# Patient Record
Sex: MALE | URBAN - METROPOLITAN AREA
[De-identification: names, ages, dates, MRNs, and addresses within clinical notes are randomized per-mention and may not be internally consistent; named-entity substitution may affect disease eponyms.]

---

## 2020-02-14 ENCOUNTER — HOSPITAL ENCOUNTER (OUTPATIENT)
Dept: OCCUPATIONAL MEDICINE | Age: 55
Discharge: HOME | End: 2020-02-14

## 2020-02-14 DIAGNOSIS — Z00.8 HEALTH EXAMINATION IN POPULATION SURVEY: ICD-10-CM

## 2021-05-07 ENCOUNTER — HOSPITAL ENCOUNTER (OUTPATIENT)
Dept: OCCUPATIONAL MEDICINE | Age: 56
Discharge: HOME | End: 2021-05-07

## 2021-05-07 ENCOUNTER — TRANSCRIBE ORDER (OUTPATIENT)
Dept: OCCUPATIONAL MEDICINE | Age: 56
End: 2021-05-07

## 2021-05-07 DIAGNOSIS — Z00.8 HEALTH EXAMINATION IN POPULATION SURVEYS: ICD-10-CM

## 2021-05-07 DIAGNOSIS — Z00.8 HEALTH EXAMINATION IN POPULATION SURVEYS: Primary | ICD-10-CM

## 2024-05-07 NOTE — PROGRESS NOTES
SHANTELLE pallidum Ab; Future  -     TSH with Reflex; Future    Other orders  -     memantine (NAMENDA) 5 MG tablet; Take 1 tablet by mouth 2 times daily  -     donepezil (ARICEPT ODT) 10 MG disintegrating tablet; Take 1 tablet by mouth nightly          Follow-up and Dispositions    Return in about 6 months (around 11/9/2024) for Dementia.           I spent  45 total minutes of today's visit in coordination of care and patient/family education and counseling regarding the above patient concerns, reviewing the patient's medical record, my assessment and recommendations.       Billy Caro JR, PA  [facility] [address], [place] [address]  Phone:[phone]

## 2024-05-09 ENCOUNTER — OFFICE VISIT (OUTPATIENT)
Dept: NEUROLOGY | Age: 59
End: 2024-05-09
Payer: OTHER GOVERNMENT

## 2024-05-09 VITALS
WEIGHT: 188 LBS | HEART RATE: 59 BPM | OXYGEN SATURATION: 97 % | DIASTOLIC BLOOD PRESSURE: 79 MMHG | HEIGHT: 70 IN | SYSTOLIC BLOOD PRESSURE: 131 MMHG | BODY MASS INDEX: 26.92 KG/M2

## 2024-05-09 DIAGNOSIS — R41.89 COGNITIVE IMPAIRMENT: ICD-10-CM

## 2024-05-09 DIAGNOSIS — R41.89 COGNITIVE IMPAIRMENT: Primary | ICD-10-CM

## 2024-05-09 LAB
FOLATE SERPL-MCNC: 9.1 NG/ML (ref 3.1–17.5)
TSH W FREE THYROID IF ABNORMAL: 1.74 UIU/ML (ref 0.27–4.2)
VIT B12 SERPL-MCNC: 593 PG/ML (ref 193–986)

## 2024-05-09 PROCEDURE — 99204 OFFICE O/P NEW MOD 45 MIN: CPT | Performed by: PHYSICAL THERAPIST

## 2024-05-09 RX ORDER — DONEPEZIL HYDROCHLORIDE 5 MG/1
10 TABLET, FILM COATED ORAL NIGHTLY
COMMUNITY
Start: 2024-04-30 | End: 2024-05-09

## 2024-05-09 RX ORDER — MEMANTINE HYDROCHLORIDE 5 MG/1
5 TABLET ORAL 2 TIMES DAILY
Qty: 180 TABLET | Refills: 1 | Status: SHIPPED | OUTPATIENT
Start: 2024-05-09

## 2024-05-09 RX ORDER — DONEPEZIL HYDROCHLORIDE 10 MG/1
10 TABLET, ORALLY DISINTEGRATING ORAL NIGHTLY
Qty: 90 TABLET | Refills: 3 | Status: SHIPPED | OUTPATIENT
Start: 2024-05-09

## 2024-05-09 RX ORDER — VALACYCLOVIR HYDROCHLORIDE 1 G/1
1 TABLET, FILM COATED ORAL DAILY
COMMUNITY
Start: 2020-02-03

## 2024-05-09 ASSESSMENT — PATIENT HEALTH QUESTIONNAIRE - PHQ9
SUM OF ALL RESPONSES TO PHQ QUESTIONS 1-9: 0
SUM OF ALL RESPONSES TO PHQ9 QUESTIONS 1 & 2: 0
2. FEELING DOWN, DEPRESSED OR HOPELESS: NOT AT ALL
SUM OF ALL RESPONSES TO PHQ QUESTIONS 1-9: 0
1. LITTLE INTEREST OR PLEASURE IN DOING THINGS: NOT AT ALL

## 2024-05-09 ASSESSMENT — ENCOUNTER SYMPTOMS
VOMITING: 0
PHOTOPHOBIA: 0
COUGH: 0
SINUS PRESSURE: 0
NAUSEA: 0
DIARRHEA: 0
EYE PAIN: 0
APNEA: 0
SINUS PAIN: 0
SHORTNESS OF BREATH: 0

## 2024-05-13 LAB — T PALLIDUM AB SER QL IA: NON REACTIVE

## 2024-05-14 LAB — METHYLMALONATE SERPL-SCNC: 99 NMOL/L (ref 0–378)

## 2024-05-15 RX ORDER — MEMANTINE HYDROCHLORIDE 5 MG/1
5 TABLET ORAL 2 TIMES DAILY
Qty: 180 TABLET | Refills: 1 | Status: SHIPPED | OUTPATIENT
Start: 2024-05-15

## 2024-05-15 RX ORDER — DONEPEZIL HYDROCHLORIDE 10 MG/1
10 TABLET, ORALLY DISINTEGRATING ORAL NIGHTLY
Qty: 90 TABLET | Refills: 3 | Status: SHIPPED | OUTPATIENT
Start: 2024-05-15

## 2024-11-06 ENCOUNTER — OFFICE VISIT (OUTPATIENT)
Dept: NEUROLOGY | Age: 59
End: 2024-11-06
Payer: OTHER GOVERNMENT

## 2024-11-06 VITALS
OXYGEN SATURATION: 97 % | HEART RATE: 64 BPM | DIASTOLIC BLOOD PRESSURE: 69 MMHG | WEIGHT: 183.2 LBS | BODY MASS INDEX: 26.23 KG/M2 | SYSTOLIC BLOOD PRESSURE: 125 MMHG | HEIGHT: 70 IN

## 2024-11-06 DIAGNOSIS — R41.89 COGNITIVE IMPAIRMENT: ICD-10-CM

## 2024-11-06 DIAGNOSIS — R41.89 COGNITIVE IMPAIRMENT: Primary | ICD-10-CM

## 2024-11-06 PROCEDURE — 99214 OFFICE O/P EST MOD 30 MIN: CPT | Performed by: PHYSICAL THERAPIST

## 2024-11-06 ASSESSMENT — PATIENT HEALTH QUESTIONNAIRE - PHQ9
2. FEELING DOWN, DEPRESSED OR HOPELESS: NOT AT ALL
SUM OF ALL RESPONSES TO PHQ QUESTIONS 1-9: 0
1. LITTLE INTEREST OR PLEASURE IN DOING THINGS: NOT AT ALL
SUM OF ALL RESPONSES TO PHQ QUESTIONS 1-9: 0
SUM OF ALL RESPONSES TO PHQ9 QUESTIONS 1 & 2: 0

## 2024-11-06 ASSESSMENT — ENCOUNTER SYMPTOMS
SINUS PRESSURE: 0
APNEA: 0
NAUSEA: 0
COUGH: 0
EYE PAIN: 0
DIARRHEA: 0
SINUS PAIN: 0
VOMITING: 0
SHORTNESS OF BREATH: 0
PHOTOPHOBIA: 0

## 2024-11-06 NOTE — PROGRESS NOTES
[facility] [address], [address]  [place] [address]  [phone]      Chief Complaint   Patient presents with    Follow-up    Dementia       Original HPI  Billy Jha is a 59 y.o. male who [facility] due [facility] decline.  He is accompanied by his wife who helps with his history.  Patient is a poor historian.  They report cognitive climates going on since about 2017.  Around this time he was started to need a little more help at work and was forgetting some of the tasks that were generally part of his day-to-day routine.  Back then they generally ignored this thinking it might have been due to stress or other issues.  This is culminated over the last year and they have noticed that he has been repeating himself somewhat more regularly, forgetting tasks at home, generally forgetting parts of conversations, and needing more help with his day-to-day routine.  This includes remembering appointments, remembering to take his medication, and reminders for day-to-day tasks.  There are not any sleeping issues, he continues to have good appetite.  They deny any emotional outburst or dysregulation.  Family history is significant for his father having dementia.    Interval history:  Patient presents today with his wife.  They tell me they [facility] who thinks that some of his memory impairments may be coming from psychiatric stressors from the death of his daughter.  Initially, his wife was described by this as she felt she was being called and enabler.  They do have a behavioral health appointment coming up.  Otherwise he has been compliant with medications, no changes since last visit.    Past Medical History:   Diagnosis Date    High cholesterol     Memory problem     Vision problems        Past Surgical History:   Procedure Laterality Date    SHOULDER SURGERY  02/18/2018       No family history on file.    Social History

## 2024-11-11 LAB — IMMUNOLOGIST REVIEW: NORMAL

## 2024-11-19 ENCOUNTER — TELEPHONE (OUTPATIENT)
Dept: NEUROLOGY | Age: 59
End: 2024-11-19